# Patient Record
(demographics unavailable — no encounter records)

---

## 2024-11-14 NOTE — HISTORY OF PRESENT ILLNESS
[de-identified] : 27 year old male  (RHD, Desk Job, rec bball , works out)  Right shiudker when playing basketball when felt shoulder dislocate  7/27/24  The pain is located  ant and lateral and deep.  had instabiliyt event in 2023 tx nonop with PT The pain is associated with  clicking, popping, instability Worse with activity and better at rest. Has tried sling, activiyt mod, nsaids1, ice H/O left labral surgery Dr. Hudson 2015 8/1/24 - cont instability symptoms, had mri  ***s/p R shoulder ant labrum, SLAP , daniel HH defect on 9/6/24***  9/16/24 - po visit, [place] 11/14/24 - cont on PT protocol

## 2024-11-14 NOTE — HISTORY OF PRESENT ILLNESS
[de-identified] : 27 year old male  (RHD, Desk Job, rec bball , works out)  Right shiudker when playing basketball when felt shoulder dislocate  7/27/24  The pain is located  ant and lateral and deep.  had instabiliyt event in 2023 tx nonop with PT The pain is associated with  clicking, popping, instability Worse with activity and better at rest. Has tried sling, activiyt mod, nsaids1, ice H/O left labral surgery Dr. Hudson 2015 8/1/24 - cont instability symptoms, had mri  ***s/p R shoulder ant labrum, SLAP , daniel HH defect on 9/6/24***  9/16/24 - po visit, [place] 11/14/24 - cont on PT protocol

## 2024-11-14 NOTE — ASSESSMENT
[FreeTextEntry1] : s/p R shoulder ant labrum, SLAP , daniel HH defect on 9/6/24    - The patient was advised of the diagnosis.  The natural history of the pathology was explained to the patient in layman's terms.  Several different treatment options were discussed and explained including the risks and benefits of both surgical and non-surgical treatments.    - they will continue conservative treatment with PT, icing, and anti-inflammatory medications. - cont PT on post op protocol - The patient was provided with a prescription for Physical Therapy  - Home exercises program learned at physical therapy. - The patient was advised to apply ice (wrapped in a towel or protective covering) to the area daily (20 minutes at a time, 2-4X/day). - fu 8 week re-eval

## 2024-11-14 NOTE — IMAGING
[de-identified] :  RIGHT SHOULDER Inspection: well healed surg scars Palpation: No Tenderness is noted  Range of motion:  , ER 55, @90ER 70, @90IR 30 Strength: Forward Flexion 4/5. Abduction 4/5.  External Rotation 4/5 and Internal Rotation 5/5 Neurological testing: motor and sensor intact distally. Ligament Stability and Special Tests:  Shoulder apprehension: neg Shoulder relocation: neg Obriens test: neg Biceps Active test: neg Paredes Labral Shear: neg Impingement testing: neg Wil testing: neg Cross Body Adduction: neg

## 2024-11-14 NOTE — IMAGING
[de-identified] :  RIGHT SHOULDER Inspection: well healed surg scars Palpation: No Tenderness is noted  Range of motion:  , ER 55, @90ER 70, @90IR 30 Strength: Forward Flexion 4/5. Abduction 4/5.  External Rotation 4/5 and Internal Rotation 5/5 Neurological testing: motor and sensor intact distally. Ligament Stability and Special Tests:  Shoulder apprehension: neg Shoulder relocation: neg Obriens test: neg Biceps Active test: neg Paredes Labral Shear: neg Impingement testing: neg Wil testing: neg Cross Body Adduction: neg

## 2025-01-13 NOTE — IMAGING
[de-identified] : RIGHT SHOULDER Inspection: well healed surg scars Palpation: No Tenderness is noted  Range of motion:  , ER 55, @90ER 70, @90IR 40 Strength: Forward Flexion 5-/5. Abduction 5-/5.  External Rotation 5-/5 and Internal Rotation 5/5 Neurological testing: motor and sensor intact distally. Ligament Stability and Special Tests:  Shoulder apprehension: neg Shoulder relocation: neg Obriens test: neg Biceps Active test: neg Paredes Labral Shear: neg Impingement testing: neg Wil testing: neg Cross Body Adduction: neg

## 2025-01-13 NOTE — HISTORY OF PRESENT ILLNESS
[de-identified] : 27 year old male  (RHD, Desk Job, rec bball , works out)  Right shiudker when playing basketball when felt shoulder dislocate  7/27/24  The pain is located  ant and lateral and deep.  had instabiliyt event in 2023 tx nonop with PT The pain is associated with  clicking, popping, instability Worse with activity and better at rest. Has tried sling, activiyt mod, nsaids1, ice H/O left labral surgery Dr. Hudson 2015 8/1/24 - cont instability symptoms, had mri  ***s/p R shoulder ant labrum, SLAP , daniel HH defect on 9/6/24***  9/16/24 - po visit, starting PT in [place] [facility] 11/14/24 - cont on PT protocol 1/13/25 - cont on protocl along with HEP , had insurance issues so had break in PT, and working hard , no new issues

## 2025-01-13 NOTE — ASSESSMENT
[FreeTextEntry1] : s/p R shoulder ant labrum, SLAP , daniel HH defect on 9/6/24    - The patient was advised of the diagnosis.  The natural history of the pathology was explained to the patient in layman's terms.  Several different treatment options were discussed and explained including the risks and benefits of both surgical and non-surgical treatments.    - they will continue conservative treatment with PT, icing, and anti-inflammatory medications. - cont PT on post op protocol - The patient was provided with a prescription for Physical Therapy  - Home exercises program learned at physical therapy. - fu 6 week re-eval and consider RTP

## 2025-01-13 NOTE — ASSESSMENT
[FreeTextEntry1] : s/p R shoulder ant labrum, SLAP , dainel HH defect on 9/6/24    - The patient was advised of the diagnosis.  The natural history of the pathology was explained to the patient in layman's terms.  Several different treatment options were discussed and explained including the risks and benefits of both surgical and non-surgical treatments.    - they will continue conservative treatment with PT, icing, and anti-inflammatory medications. - cont PT on post op protocol - The patient was provided with a prescription for Physical Therapy  - Home exercises program learned at physical therapy. - fu 6 week re-eval and consider RTP

## 2025-01-13 NOTE — IMAGING
[de-identified] : RIGHT SHOULDER Inspection: well healed surg scars Palpation: No Tenderness is noted  Range of motion:  , ER 55, @90ER 70, @90IR 40 Strength: Forward Flexion 5-/5. Abduction 5-/5.  External Rotation 5-/5 and Internal Rotation 5/5 Neurological testing: motor and sensor intact distally. Ligament Stability and Special Tests:  Shoulder apprehension: neg Shoulder relocation: neg Obriens test: neg Biceps Active test: neg Paredes Labral Shear: neg Impingement testing: neg Wil testing: neg Cross Body Adduction: neg

## 2025-01-13 NOTE — HISTORY OF PRESENT ILLNESS
[de-identified] : 27 year old male  (RHD, Desk Job, rec bball , works out)  Right shiudker when playing basketball when felt shoulder dislocate  7/27/24  The pain is located  ant and lateral and deep.  had instabiliyt event in 2023 tx nonop with PT The pain is associated with  clicking, popping, instability Worse with activity and better at rest. Has tried sling, activiyt mod, nsaids1, ice H/O left labral surgery Dr. Hudson 2015 8/1/24 - cont instability symptoms, had mri  ***s/p R shoulder ant labrum, SLAP , daniel HH defect on 9/6/24***  9/16/24 - po visit, starting PT in [place] [facility] 11/14/24 - cont on PT protocol 1/13/25 - cont on protocl along with HEP , had insurance issues so had break in PT, and working hard , no new issues

## 2025-05-28 NOTE — HISTORY OF PRESENT ILLNESS
[de-identified] : 05/28/2025 CHANO hayes 28 year  is here today for evaluation of right hip pain x about 3 weeks ago, reports a slip and fall while playing basketball. Patient reports pain is worse with internal or external rotation. Patient notes pain is localized. Patient notes resting and HEP helps with pain relief. Patient denies N/T reports some sharp pain. Pain is localized.

## 2025-05-28 NOTE — DISCUSSION/SUMMARY
[de-identified] : Elvin has most likely an adductor strain in his right groin but also has NAM and likely labral tear.  His pain has been improving and he has minimal pain today.  Given prescription for physical therapy and counseled him on over-the-counter anti-inflammatory use at length.  If he does not improve in the next 6 to 8 weeks recommend an MRI of his right hip to evaluate for labral tear.  That shows a labral tear and attic pathology would consider diagnostic injection at that point.  All of his questions were answered he is in agreement with this plan.

## 2025-05-28 NOTE — IMAGING
[de-identified] : General: NAD, A&Ox3 Gait: Non-antalgic, no Trendelenburg Foot Progression: neutral Knee Progression: neutral   R Hip Exam: Pain with Log Roll - negative Flexion: 110 Pain with Hyperflexion - yes FADIR - pos SAM - neg Extension: 20 Flexion Contracture - none Prone Apprehension Test - neg Prone Rotation Test - symmetric Ischial tenderness - none Trochanteric tenderness - none   Abduction - 4 /5, pain - yes Adduction - 5 /5, pain - yes Supine resisted SLR - 5 /5, pain - no Seated resisted hip flexion - 5 /5, pain - no Dorsiflexion 5/5 Plantarflexion 5/5 EHL 5/5 DP/PT 2+, foot is warm and well perfused        Leg Lengths: symmetric    [Right] : right hip with pelvis [All Views] : anteroposterior, lateral [Femoral CAM lesion with Alpha angle greater than 50] : Femoral CAM lesion with Alpha angle greater than 50